# Patient Record
Sex: MALE | Race: WHITE | HISPANIC OR LATINO
[De-identification: names, ages, dates, MRNs, and addresses within clinical notes are randomized per-mention and may not be internally consistent; named-entity substitution may affect disease eponyms.]

---

## 2020-02-21 ENCOUNTER — APPOINTMENT (OUTPATIENT)
Dept: MRI IMAGING | Facility: CLINIC | Age: 40
End: 2020-02-21
Payer: COMMERCIAL

## 2020-02-21 PROCEDURE — 73218 MRI UPPER EXTREMITY W/O DYE: CPT | Mod: LT

## 2021-08-01 ENCOUNTER — EMERGENCY (EMERGENCY)
Facility: HOSPITAL | Age: 41
LOS: 1 days | End: 2021-08-01
Admitting: EMERGENCY MEDICINE
Payer: COMMERCIAL

## 2021-08-01 ENCOUNTER — INPATIENT (INPATIENT)
Facility: HOSPITAL | Age: 41
LOS: 1 days | Discharge: HOME | DRG: 446 | End: 2021-08-03
Attending: HOSPITALIST | Admitting: HOSPITALIST
Payer: COMMERCIAL

## 2021-08-01 ENCOUNTER — TRANSCRIPTION ENCOUNTER (OUTPATIENT)
Age: 41
End: 2021-08-01

## 2021-08-01 VITALS
HEART RATE: 68 BPM | TEMPERATURE: 99 F | OXYGEN SATURATION: 99 % | WEIGHT: 160.06 LBS | DIASTOLIC BLOOD PRESSURE: 82 MMHG | HEIGHT: 65 IN | RESPIRATION RATE: 18 BRPM | SYSTOLIC BLOOD PRESSURE: 126 MMHG

## 2021-08-01 DIAGNOSIS — Z90.49 ACQUIRED ABSENCE OF OTHER SPECIFIED PARTS OF DIGESTIVE TRACT: Chronic | ICD-10-CM

## 2021-08-01 DIAGNOSIS — K80.50 CALCULUS OF BILE DUCT WITHOUT CHOLANGITIS OR CHOLECYSTITIS WITHOUT OBSTRUCTION: ICD-10-CM

## 2021-08-01 LAB
ALBUMIN SERPL ELPH-MCNC: 4.5 G/DL — SIGNIFICANT CHANGE UP (ref 3.3–5.2)
ALP SERPL-CCNC: 143 U/L — HIGH (ref 40–120)
ALT FLD-CCNC: 471 U/L — HIGH
ANION GAP SERPL CALC-SCNC: 12 MMOL/L — SIGNIFICANT CHANGE UP (ref 5–17)
APTT BLD: 35.2 SEC — SIGNIFICANT CHANGE UP (ref 27.5–35.5)
AST SERPL-CCNC: 531 U/L — HIGH
BASOPHILS # BLD AUTO: 0.06 K/UL — SIGNIFICANT CHANGE UP (ref 0–0.2)
BASOPHILS NFR BLD AUTO: 0.7 % — SIGNIFICANT CHANGE UP (ref 0–2)
BILIRUB DIRECT SERPL-MCNC: 1.4 MG/DL — HIGH (ref 0–0.3)
BILIRUB INDIRECT FLD-MCNC: 0.8 MG/DL — SIGNIFICANT CHANGE UP (ref 0.2–1)
BILIRUB SERPL-MCNC: 2.2 MG/DL — HIGH (ref 0.4–2)
BUN SERPL-MCNC: 6.3 MG/DL — LOW (ref 8–20)
CALCIUM SERPL-MCNC: 9.6 MG/DL — SIGNIFICANT CHANGE UP (ref 8.6–10.2)
CHLORIDE SERPL-SCNC: 101 MMOL/L — SIGNIFICANT CHANGE UP (ref 98–107)
CO2 SERPL-SCNC: 26 MMOL/L — SIGNIFICANT CHANGE UP (ref 22–29)
CREAT SERPL-MCNC: 0.55 MG/DL — SIGNIFICANT CHANGE UP (ref 0.5–1.3)
EOSINOPHIL # BLD AUTO: 0.13 K/UL — SIGNIFICANT CHANGE UP (ref 0–0.5)
EOSINOPHIL NFR BLD AUTO: 1.5 % — SIGNIFICANT CHANGE UP (ref 0–6)
GLUCOSE SERPL-MCNC: 87 MG/DL — SIGNIFICANT CHANGE UP (ref 70–99)
HCT VFR BLD CALC: 47.7 % — SIGNIFICANT CHANGE UP (ref 39–50)
HGB BLD-MCNC: 15.1 G/DL — SIGNIFICANT CHANGE UP (ref 13–17)
IMM GRANULOCYTES NFR BLD AUTO: 0.2 % — SIGNIFICANT CHANGE UP (ref 0–1.5)
INR BLD: 1.13 RATIO — SIGNIFICANT CHANGE UP (ref 0.88–1.16)
LYMPHOCYTES # BLD AUTO: 2.04 K/UL — SIGNIFICANT CHANGE UP (ref 1–3.3)
LYMPHOCYTES # BLD AUTO: 23.8 % — SIGNIFICANT CHANGE UP (ref 13–44)
MCHC RBC-ENTMCNC: 28.2 PG — SIGNIFICANT CHANGE UP (ref 27–34)
MCHC RBC-ENTMCNC: 31.7 GM/DL — LOW (ref 32–36)
MCV RBC AUTO: 89.2 FL — SIGNIFICANT CHANGE UP (ref 80–100)
MONOCYTES # BLD AUTO: 0.98 K/UL — HIGH (ref 0–0.9)
MONOCYTES NFR BLD AUTO: 11.4 % — SIGNIFICANT CHANGE UP (ref 2–14)
NEUTROPHILS # BLD AUTO: 5.35 K/UL — SIGNIFICANT CHANGE UP (ref 1.8–7.4)
NEUTROPHILS NFR BLD AUTO: 62.4 % — SIGNIFICANT CHANGE UP (ref 43–77)
PLATELET # BLD AUTO: 402 K/UL — HIGH (ref 150–400)
POTASSIUM SERPL-MCNC: 3.8 MMOL/L — SIGNIFICANT CHANGE UP (ref 3.5–5.3)
POTASSIUM SERPL-SCNC: 3.8 MMOL/L — SIGNIFICANT CHANGE UP (ref 3.5–5.3)
PROT SERPL-MCNC: 7.9 G/DL — SIGNIFICANT CHANGE UP (ref 6.6–8.7)
PROTHROM AB SERPL-ACNC: 13 SEC — SIGNIFICANT CHANGE UP (ref 10.6–13.6)
RBC # BLD: 5.35 M/UL — SIGNIFICANT CHANGE UP (ref 4.2–5.8)
RBC # FLD: 14.1 % — SIGNIFICANT CHANGE UP (ref 10.3–14.5)
SARS-COV-2 RNA SPEC QL NAA+PROBE: SIGNIFICANT CHANGE UP
SODIUM SERPL-SCNC: 139 MMOL/L — SIGNIFICANT CHANGE UP (ref 135–145)
WBC # BLD: 8.58 K/UL — SIGNIFICANT CHANGE UP (ref 3.8–10.5)
WBC # FLD AUTO: 8.58 K/UL — SIGNIFICANT CHANGE UP (ref 3.8–10.5)

## 2021-08-01 PROCEDURE — 71046 X-RAY EXAM CHEST 2 VIEWS: CPT | Mod: 26

## 2021-08-01 PROCEDURE — 99285 EMERGENCY DEPT VISIT HI MDM: CPT

## 2021-08-01 PROCEDURE — 99223 1ST HOSP IP/OBS HIGH 75: CPT

## 2021-08-01 PROCEDURE — 93010 ELECTROCARDIOGRAM REPORT: CPT

## 2021-08-01 PROCEDURE — 74177 CT ABD & PELVIS W/CONTRAST: CPT | Mod: 26,59

## 2021-08-01 NOTE — ED PROVIDER NOTE - OBJECTIVE STATEMENT
Pt is a 41 yo M [place] [facility] [place]. Pt states that 3 wks ago he had an episode of RUQ pain that resolved. Pt states that it happened again earlier this week and once again resolved. Pt states that 3 d ago it came back and has been constant since then. Pt states that the pain is moderate and radiates to the back and feels like his pain from his previous gallstones for which he had his gb taken out. pt states that he went to [facility] and had labs and CT and was told there is a stone in there and was sent to the ER.  no fever/chills. no cp. no sob. no other complaints.

## 2021-08-01 NOTE — H&P ADULT - HISTORY OF PRESENT ILLNESS
39 y/o male with no significant PMH was sent to the ED [place] [facility] [place]. Patient complained of RUQ pain that has been going on for 3 weeks; initially started as sharp pain similar to what he had many years ago before getting cholecystectomy (2012). Pain resolved without intervention but came back later; in the last 3 days, pain has been constant, dull, non-radiating, associated with nausea and one episode on non-bloody emesis and bloating. He has no fever, chills, shortness of breath, chest pain, palpitation, change in bowel/urinary habit, sick contact, recent travel. While in [facility] [place] done: findings most c/w choledocholithiasis. Uncinate pancreatic calcifications are considered an alternate much less likely possibility. CMP with elevated AST/ALT.

## 2021-08-01 NOTE — H&P ADULT - ASSESSMENT
39 y/o male with no significant PMH was sent to the ED from [facility] [place] for possible ERCP. Patient complained of RUQ pain that has been going on for 3 weeks; initially started as sharp pain similar to what he had many years ago before getting cholecystectomy (2012). Pain resolved without intervention but came back later; in the last 3 days, pain has been constant, dull, non-radiating, associated with nausea and one episode on non-bloody emesis and bloating. He has no fever, chills, shortness of breath, chest pain, palpitation, change in bowel/urinary habit, sick contact, recent travel. While in [facility] [place], CT done: findings most c/w choledocholithiasis. Uncinate pancreatic calcifications are considered an alternate much less likely possibility. CMP with elevated AST/ALT.     Choledocholithiasis   Admit to medical floor   CT abdomen as noted above   Pain control   GI consulted     Elevated LFT   AST and ALT markedly elevated compare to prior from [facility] [place]   Total wong: 2.2 (direct 1.4)  Trend LFT     Supportive   DVT prophylaxis: ambulate as tolerated   Diet: NPO after midnight     Plan of care discussed with patient

## 2021-08-01 NOTE — ED PROVIDER NOTE - CLINICAL SUMMARY MEDICAL DECISION MAKING FREE TEXT BOX
labs and imaging from [facility] reviewed. case d/w DR. Knight for admission. will admit for ercp.

## 2021-08-01 NOTE — ED PROVIDER NOTE - PHYSICAL EXAMINATION
Constitutional - well-developed; well nourished. Head - NCAT. Airway patent. Eyes - PERRL. CV - RRR. no murmur. no edema. Pulm - CTAB. Abd - soft, ruq ttp. no rebound. no guarding. Neuro - A&Ox3. strength 5/5 x4. sensation intact x4. normal gait. Skin - No rash. MSK - normal ROM.

## 2021-08-01 NOTE — H&P ADULT - NSICDXFAMILYHX_GEN_ALL_CORE_FT
FAMILY HISTORY:  Mother  Still living? Yes, Estimated age: Age Unknown  FH: diabetes mellitus, Age at diagnosis: Age Unknown

## 2021-08-01 NOTE — H&P ADULT - NSHPPHYSICALEXAM_GEN_ALL_CORE
Vital Signs Last 24 Hrs  T(C): 37.1 (01 Aug 2021 19:50), Max: 37.1 (01 Aug 2021 19:50)  T(F): 98.7 (01 Aug 2021 19:50), Max: 98.7 (01 Aug 2021 19:50)  HR: 68 (01 Aug 2021 19:50) (68 - 68)  BP: 126/82 (01 Aug 2021 19:50) (126/82 - 126/82)  BP(mean): --  RR: 18 (01 Aug 2021 19:50) (18 - 18)  SpO2: 99% (01 Aug 2021 19:50) (99% - 99%)

## 2021-08-02 ENCOUNTER — RESULT REVIEW (OUTPATIENT)
Age: 41
End: 2021-08-02

## 2021-08-02 ENCOUNTER — TRANSCRIPTION ENCOUNTER (OUTPATIENT)
Age: 41
End: 2021-08-02

## 2021-08-02 LAB
ALBUMIN SERPL ELPH-MCNC: 4.2 G/DL — SIGNIFICANT CHANGE UP (ref 3.3–5.2)
ALP SERPL-CCNC: 151 U/L — HIGH (ref 40–120)
ALT FLD-CCNC: 578 U/L — HIGH
ANION GAP SERPL CALC-SCNC: 11 MMOL/L — SIGNIFICANT CHANGE UP (ref 5–17)
AST SERPL-CCNC: 494 U/L — HIGH
BILIRUB SERPL-MCNC: 2.7 MG/DL — HIGH (ref 0.4–2)
BUN SERPL-MCNC: 9.4 MG/DL — SIGNIFICANT CHANGE UP (ref 8–20)
CALCIUM SERPL-MCNC: 9.4 MG/DL — SIGNIFICANT CHANGE UP (ref 8.6–10.2)
CHLORIDE SERPL-SCNC: 100 MMOL/L — SIGNIFICANT CHANGE UP (ref 98–107)
CO2 SERPL-SCNC: 26 MMOL/L — SIGNIFICANT CHANGE UP (ref 22–29)
CREAT SERPL-MCNC: 0.8 MG/DL — SIGNIFICANT CHANGE UP (ref 0.5–1.3)
GLUCOSE SERPL-MCNC: 106 MG/DL — HIGH (ref 70–99)
POTASSIUM SERPL-MCNC: 4 MMOL/L — SIGNIFICANT CHANGE UP (ref 3.5–5.3)
POTASSIUM SERPL-SCNC: 4 MMOL/L — SIGNIFICANT CHANGE UP (ref 3.5–5.3)
PROT SERPL-MCNC: 7.1 G/DL — SIGNIFICANT CHANGE UP (ref 6.6–8.7)
SODIUM SERPL-SCNC: 137 MMOL/L — SIGNIFICANT CHANGE UP (ref 135–145)

## 2021-08-02 PROCEDURE — 43259 EGD US EXAM DUODENUM/JEJUNUM: CPT | Mod: 59

## 2021-08-02 PROCEDURE — 99222 1ST HOSP IP/OBS MODERATE 55: CPT | Mod: 57

## 2021-08-02 PROCEDURE — 99233 SBSQ HOSP IP/OBS HIGH 50: CPT

## 2021-08-02 PROCEDURE — 88342 IMHCHEM/IMCYTCHM 1ST ANTB: CPT | Mod: 26

## 2021-08-02 PROCEDURE — 88305 TISSUE EXAM BY PATHOLOGIST: CPT | Mod: 26

## 2021-08-02 PROCEDURE — 43264 ERCP REMOVE DUCT CALCULI: CPT

## 2021-08-02 RX ORDER — CIPROFLOXACIN LACTATE 400MG/40ML
400 VIAL (ML) INTRAVENOUS EVERY 12 HOURS
Refills: 0 | Status: COMPLETED | OUTPATIENT
Start: 2021-08-02 | End: 2021-08-02

## 2021-08-02 RX ORDER — SODIUM CHLORIDE 9 MG/ML
1000 INJECTION, SOLUTION INTRAVENOUS
Refills: 0 | Status: DISCONTINUED | OUTPATIENT
Start: 2021-08-02 | End: 2021-08-03

## 2021-08-02 RX ORDER — INDOMETHACIN 50 MG
100 CAPSULE ORAL ONCE
Refills: 0 | Status: DISCONTINUED | OUTPATIENT
Start: 2021-08-02 | End: 2021-08-03

## 2021-08-02 RX ADMIN — Medication 200 MILLIGRAM(S): at 10:47

## 2021-08-02 NOTE — DISCHARGE NOTE PROVIDER - HOSPITAL COURSE
39 y/o male with no significant PMH was sent to the ED from [facility] [place] for possible ERCP. While in [facility] [place], [place] done: findings most c/w choledocholithiasis. Uncinate pancreatic calcifications are considered an alternate much less likely possibility. CMP with elevated AST/ALT. GI consult appreciated. s/p ERCP 8/2/21: CBD stones s/p successful sphincterotomy and stone removal   Plan to advance diet tonight, trend liver enzymes in AM.     Physical Exam:       Time spent on discharge: 39 y/o male with no significant PMH was sent to the ED from [facility] [place] for possible ERCP. While in [facility], CT done: findings most c/w choledocholithiasis. Uncinate pancreatic calcifications are considered an alternate much less likely possibility. CMP with elevated AST/ALT. GI consult appreciated. s/p ERCP 8/2/21: CBD stones s/p successful sphincterotomy and stone removal   Plan to advance diet tonight, trend liver enzymes in AM.     Vital Signs Last 24 Hrs  T(C): 36.4 (03 Aug 2021 04:13), Max: 36.8 (02 Aug 2021 17:33)  T(F): 97.5 (03 Aug 2021 04:13), Max: 98.2 (02 Aug 2021 17:33)  HR: 72 (03 Aug 2021 04:13) (70 - 87)  BP: 114/64 (03 Aug 2021 04:13) (106/67 - 131/88)  BP(mean): --  RR: 18 (03 Aug 2021 04:13) (18 - 19)  SpO2: 98% (03 Aug 2021 04:13) (95% - 98%)    Physical exam:  CONSTITUTIONAL: NAD, well-developed, lying in bed   ENMT: Moist oral mucosa  RESPIRATORY: Normal respiratory effort; lungs are clear to auscultation bilaterally  CARDIOVASCULAR: Regular rate and rhythm, normal S1 and S2, No lower extremity edema  ABDOMEN: Nontender to palpation, normoactive bowel sounds  MUSCULOSKELETAL: No clubbing or cyanosis of digits  PSYCH: A+O to person, place, and time; affect appropriate  NEUROLOGY: No gross sensory or motor deficits     Time spent on discharge: 35 mins 41 y/o male with no significant PMH was sent to the ED from [facility] [place] for possible ERCP. While in [facility], [place] done: findings most c/w choledocholithiasis. Uncinate pancreatic calcifications are considered an alternate much less likely possibility. CMP with elevated AST/ALT. GI consult appreciated. s/p ERCP 8/2/21: CBD stones s/p successful sphincterotomy and stone removal     patient monitored opvernight and tolerated diet and is cleared for dc home    Vital Signs Last 24 Hrs  T(C): 36.4 (03 Aug 2021 04:13), Max: 36.8 (02 Aug 2021 17:33)  T(F): 97.5 (03 Aug 2021 04:13), Max: 98.2 (02 Aug 2021 17:33)  HR: 72 (03 Aug 2021 04:13) (70 - 87)  BP: 114/64 (03 Aug 2021 04:13) (106/67 - 131/88)  BP(mean): --  RR: 18 (03 Aug 2021 04:13) (18 - 19)  SpO2: 98% (03 Aug 2021 04:13) (95% - 98%)    Physical exam:  CONSTITUTIONAL: NAD, well-developed, lying in bed   ENMT: Moist oral mucosa  RESPIRATORY: Normal respiratory effort; lungs are clear to auscultation bilaterally  CARDIOVASCULAR: Regular rate and rhythm, normal S1 and S2, No lower extremity edema  ABDOMEN: Nontender to palpation, normoactive bowel sounds  MUSCULOSKELETAL: No clubbing or cyanosis of digits  PSYCH: A+O to person, place, and time; affect appropriate  NEUROLOGY: No gross sensory or motor deficits     Time spent on discharge: 35 mins

## 2021-08-02 NOTE — DISCHARGE NOTE PROVIDER - NSDCCPCAREPLAN_GEN_ALL_CORE_FT
PRINCIPAL DISCHARGE DIAGNOSIS  Diagnosis: Choledocholithiasis  Assessment and Plan of Treatment: s/p ERCP: CBD stones s/p successful sphincterotomy and stone removal   Plan to advance diet       PRINCIPAL DISCHARGE DIAGNOSIS  Diagnosis: Choledocholithiasis  Assessment and Plan of Treatment: s/p ERCP: CBD stones s/p successful sphincterotomy and stone removal. Tolerating diet. Discharge and outpatinet follow up with GI, contact info provided.       PRINCIPAL DISCHARGE DIAGNOSIS  Diagnosis: Choledocholithiasis  Assessment and Plan of Treatment: s/p ERCP: CBD stones s/p successful sphincterotomy and stone removal. Tolerating diet. Discharge and outpatient follow up wt PMD in 1 week for repeat CMP. Outpatinet follow up with GI as needed, contact info provided.       PRINCIPAL DISCHARGE DIAGNOSIS  Diagnosis: Choledocholithiasis  Assessment and Plan of Treatment: s/p ERCP: CBD stones s/p successful sphincterotomy and stone removal. Tolerating diet. Discharge and outpatient follow up wtih PMD in 1 week for repeat CMP. Outpatinet follow up with GI in 2 weeks from discharge, contact info provided.

## 2021-08-02 NOTE — CONSULT NOTE ADULT - ASSESSMENT
The patient with history of cholecystectomy and now with abdominal pain and elevated LFTs with possible choledocholithiasis on CT abdomen.  We will schedule the patient for EGD/EUS and ERCP today.  Patient is n.p.o.  Discussed the procedure at length with the patient and he is agreeable.  Risks, benefits and alternatives were discussed.

## 2021-08-02 NOTE — PROGRESS NOTE ADULT - ASSESSMENT
41 y/o male with no significant PMH was sent to the ED from [facility] for possible ERCP. While in [facility], CT done: findings most c/w choledocholithiasis. Uncinate pancreatic calcifications are considered an alternate much less likely possibility. CMP with elevated AST/ALT.     Choledocholithiasis   CT abdomen as noted above   Pain control   GI consult appreciated  s/p ERCP today: CBD stones s/p successful sphincterotomy and stone removal   Plan to advance diet tonight, trend liver enzymes in AM     Elevated LFT   AST and ALT markedly elevated   Total wong: 2.2 (direct 1.4)  Trend LFT     Supportive   DVT prophylaxis: ambulate as tolerated   Diet: Advance diet    Dispo: Possible DC in AM

## 2021-08-02 NOTE — PROGRESS NOTE ADULT - SUBJECTIVE AND OBJECTIVE BOX
[facility] Division of Hospital Medicine    Chief Complaint:  Choledocholithiasis    SUBJECTIVE / OVERNIGHT EVENTS:  Overnight pt said he had some abdominal pain  This morning feels better, no abdominal pain currently     Patient denies chest pain, SOB, abd pain, N/V, fever, chills, dysuria or any other complaints. All remainder ROS negative.     MEDICATIONS  (STANDING):  indomethacin Suppository 100 milliGRAM(s) Rectal once  lactated ringers. 1000 milliLiter(s) (75 mL/Hr) IV Continuous <Continuous>    MEDICATIONS  (PRN):        I&O's Summary    02 Aug 2021 07:01  -  02 Aug 2021 16:25  --------------------------------------------------------  IN: 725 mL / OUT: 0 mL / NET: 725 mL        PHYSICAL EXAM:  Vital Signs Last 24 Hrs  T(C): 36.5 (02 Aug 2021 01:18), Max: 37.1 (01 Aug 2021 19:50)  T(F): 97.7 (02 Aug 2021 01:18), Max: 98.7 (01 Aug 2021 19:50)  HR: 73 (02 Aug 2021 14:48) (62 - 73)  BP: 126/80 (02 Aug 2021 14:48) (101/69 - 126/82)  BP(mean): --  RR: 19 (02 Aug 2021 14:48) (18 - 19)  SpO2: 98% (02 Aug 2021 14:48) (97% - 99%)        CONSTITUTIONAL: NAD, well-developed, lying in bed   ENMT: Moist oral mucosa  RESPIRATORY: Normal respiratory effort; lungs are clear to auscultation bilaterally  CARDIOVASCULAR: Regular rate and rhythm, normal S1 and S2, No lower extremity edema  ABDOMEN: Nontender to palpation, normoactive bowel sounds  MUSCULOSKELETAL: No clubbing or cyanosis of digits  PSYCH: A+O to person, place, and time; affect appropriate  NEUROLOGY: No gross sensory or motor deficits   SKIN: No rashes    LABS:                        15.1   8.58  )-----------( 402      ( 01 Aug 2021 21:01 )             47.7     08-02    137  |  100  |  9.4  ----------------------------<  106<H>  4.0   |  26.0  |  0.80    Ca    9.4      02 Aug 2021 07:51    TPro  7.1  /  Alb  4.2  /  TBili  2.7<H>  /  DBili  x   /  AST  494<H>  /  ALT  578<H>  /  AlkPhos  151<H>  08-02    PT/INR - ( 01 Aug 2021 21:01 )   PT: 13.0 sec;   INR: 1.13 ratio         PTT - ( 01 Aug 2021 21:01 )  PTT:35.2 sec

## 2021-08-02 NOTE — CONSULT NOTE ADULT - SUBJECTIVE AND OBJECTIVE BOX
HPI:  40-year-old man with a history of cholecystectomy in 2012 who has presented to [facility] for recurrent epigastric discomfort which is going on for the last 3 weeks..  He had one episode of vomiting.  It was not related to food intake.  No fever or chills, shortness of breath, chest pain or change in bowel habits.  He presented to [facility] and underwent CT abdomen which revealed question choledocholithiasis with uncinate pancreatic calcifications.  LFTs are elevated.  Currently his abdominal pain is better.  He is n.p.o.  No other complaints right now.      PAST MEDICAL & SURGICAL HISTORY:  S/P cholecystectomy        ROS:  No Heartburn, regurgitation, dysphagia, odynophagia.  No dyspepsia  +abdominal pain.    No Nausea, +vomiting.  No Bleeding.  No hematemesis.   No diarrhea.    No hematochesia.  No weight loss, anorexia.  No edema.      MEDICATIONS  (STANDING):  lactated ringers. 1000 milliLiter(s) (75 mL/Hr) IV Continuous <Continuous>    MEDICATIONS  (PRN):      Allergies    No Known Allergies    Intolerances        SOCIAL HISTORY:No smoking. Occasional alcohol. No drug     ENDOSCOPIC/GI HISTORY: EGD and colonoscopy in the past for diarrhea. No ERCP    FAMILY HISTORY:  FH: diabetes mellitus (Mother)        Vital Signs Last 24 Hrs  T(C): 36.5 (02 Aug 2021 01:18), Max: 37.1 (01 Aug 2021 19:50)  T(F): 97.7 (02 Aug 2021 01:18), Max: 98.7 (01 Aug 2021 19:50)  HR: 62 (02 Aug 2021 01:18) (62 - 68)  BP: 109/73 (02 Aug 2021 01:18) (101/69 - 126/82)  BP(mean): --  RR: 18 (02 Aug 2021 01:18) (18 - 18)  SpO2: 97% (02 Aug 2021 01:18) (97% - 99%)    PHYSICAL EXAM:    GENERAL: NAD, well-groomed, well-developed  HEAD:  Atraumatic, Normocephalic  EYES: EOMI, PERRLA, conjunctiva and sclera clear  ENMT: No tonsillar erythema, exudates, or enlargement; Moist mucous membranes, Good dentition, No lesions  NECK: Supple, No JVD, Normal thyroid  CHEST/LUNG: Clear to percussion bilaterally; No rales, rhonchi, wheezing, or rubs  HEART: Regular rate and rhythm; No murmurs, rubs, or gallops  ABDOMEN: Soft, Nontender, Nondistended; Bowel sounds present  EXTREMITIES:  2+ Peripheral Pulses, No clubbing, cyanosis, or edema  LYMPH: No lymphadenopathy noted  SKIN: No rashes or lesions      LABS:                        15.1   8.58  )-----------( 402      ( 01 Aug 2021 21:01 )             47.7     08-01    139  |  101  |  6.3<L>  ----------------------------<  87  3.8   |  26.0  |  0.55    Ca    9.6      01 Aug 2021 21:01    TPro  7.9  /  Alb  4.5  /  TBili  2.2<H>  /  DBili  1.4<H>  /  AST  531<H>  /  ALT  471<H>  /  AlkPhos  143<H>  08-01    PT/INR - ( 01 Aug 2021 21:01 )   PT: 13.0 sec;   INR: 1.13 ratio         PTT - ( 01 Aug 2021 21:01 )  PTT:35.2 sec       LIVER FUNCTIONS - ( 01 Aug 2021 21:01 )  Alb: 4.5 g/dL / Pro: 7.9 g/dL / ALK PHOS: 143 U/L / ALT: 471 U/L / AST: 531 U/L / GGT: x               RADIOLOGY & ADDITIONAL STUDIES:  EXAM: CT ABDOMEN AND PELVIS OC IC    PROCEDURE DATE: 08/01/2021          INTERPRETATION: CLINICAL INFORMATION: Abdominal RUQ pain    COMPARISON: None.    CONTRAST/COMPLICATIONS:  IV Contrast: Omnipaque 350 90 cc administered 10 cc discarded  Oral Contrast: Gastroview  Complications: None reported at time of study completion    PROCEDURE:  CT of the Abdomen and Pelvis was performed.  Sagittal and coronal reformats were performed.    FINDINGS:  LOWER CHEST: Within normal limits.    LIVER: Within normal limits.  BILE DUCTS: 2-3 adjacent/stacked uncinate pancreas 1-3mm stones/calcification appear to correspond to a nondilated distal CBD (coronal 602-42). No intrahepatic biliary duct dilatation  GALLBLADDER: Cholecystectomy. Faint calcification/stone in the region of the cystic duct (2/32).  SPLEEN: Within normal limits.  PANCREAS: Within normal limits.  ADRENALS: Within normal limits.  KIDNEYS/URETERS: RIGHT extrarenal pelvis. No renal stones/obstructive uropathy.    BLADDER: Within normal limits.  REPRODUCTIVE ORGANS: Prominent prostate.    BOWEL: No bowel obstruction. Appendix is normal.  PERITONEUM: No ascites.  VESSELS: Within normal limits.  RETROPERITONEUM/LYMPH NODES: No lymphadenopathy.  ABDOMINAL WALL: Within normal limits.  BONES: Within normal limits.    IMPRESSION:  1. Findings most c/w choledocholithiasis. MRCP or ERCP are next diagnostic studies of choice. Uncinate pancreatic calcifications are considered an alternate much less likely possibility.    Findings were discussed with Dr. KELVIN BEACH [phone] 8/1/2021 2:29 PM by Dr. Lopez with read back confirmation.    --- End of Report ---

## 2021-08-02 NOTE — DISCHARGE NOTE PROVIDER - CARE PROVIDER_API CALL
López Henson)  Gastroenterology; Internal Medicine  [address] [place] [address]  Phone: [phone]  Fax: [phone]  Follow Up Time:    López Henson)  Gastroenterology; Internal Medicine  [address] [place] [address]  Phone: [phone]  Fax: [phone]  Follow Up Time:     PMD,   Phone: (   )    -  Fax: (   )    -  Follow Up Time:    López Henson)  Gastroenterology; Internal Medicine  [address] [place] [address]  Phone: [phone]  Fax: [phone]  Follow Up Time: 2 weeks    PMD,   Phone: (   )    -  Fax: (   )    -  Follow Up Time: 1 week

## 2021-08-02 NOTE — DISCHARGE NOTE PROVIDER - CARE PROVIDERS DIRECT ADDRESSES
,[email or web address],DirectAddress_Unknown

## 2021-08-02 NOTE — DISCHARGE NOTE PROVIDER - PROVIDER TOKENS
PROVIDER:[TOKEN:[[id number]]] PROVIDER:[TOKEN:[[id number]]],FREE:[LAST:[PMD],PHONE:[(   )    -],FAX:[(   )    -]] PROVIDER:[TOKEN:[[id number]],FOLLOWUP:[2 weeks]],FREE:[LAST:[PMD],PHONE:[(   )    -],FAX:[(   )    -],FOLLOWUP:[1 week]]

## 2021-08-02 NOTE — ED ADULT NURSE NOTE - OBJECTIVE STATEMENT
patient c/o abdominal pain and nausea RUQ pain, went to [facility] and transferred here for ERCP in AM. Patient well appearing and in no distress. Arrived with 18g IV line to left AC in place. Hx of cholecystectomy in the past.

## 2021-08-03 ENCOUNTER — TRANSCRIPTION ENCOUNTER (OUTPATIENT)
Age: 41
End: 2021-08-03

## 2021-08-03 VITALS
DIASTOLIC BLOOD PRESSURE: 72 MMHG | OXYGEN SATURATION: 96 % | HEART RATE: 80 BPM | TEMPERATURE: 98 F | RESPIRATION RATE: 19 BRPM | SYSTOLIC BLOOD PRESSURE: 119 MMHG

## 2021-08-03 LAB
ALBUMIN SERPL ELPH-MCNC: 4.2 G/DL — SIGNIFICANT CHANGE UP (ref 3.3–5.2)
ALP SERPL-CCNC: 147 U/L — HIGH (ref 40–120)
ALT FLD-CCNC: 463 U/L — HIGH
ANION GAP SERPL CALC-SCNC: 10 MMOL/L — SIGNIFICANT CHANGE UP (ref 5–17)
AST SERPL-CCNC: 177 U/L — HIGH
BASOPHILS # BLD AUTO: 0.03 K/UL — SIGNIFICANT CHANGE UP (ref 0–0.2)
BASOPHILS NFR BLD AUTO: 0.2 % — SIGNIFICANT CHANGE UP (ref 0–2)
BILIRUB SERPL-MCNC: 1.1 MG/DL — SIGNIFICANT CHANGE UP (ref 0.4–2)
BUN SERPL-MCNC: 5.5 MG/DL — LOW (ref 8–20)
CALCIUM SERPL-MCNC: 9.6 MG/DL — SIGNIFICANT CHANGE UP (ref 8.6–10.2)
CHLORIDE SERPL-SCNC: 103 MMOL/L — SIGNIFICANT CHANGE UP (ref 98–107)
CO2 SERPL-SCNC: 27 MMOL/L — SIGNIFICANT CHANGE UP (ref 22–29)
COVID-19 SPIKE DOMAIN AB INTERP: POSITIVE
COVID-19 SPIKE DOMAIN ANTIBODY RESULT: >250 U/ML — HIGH
CREAT SERPL-MCNC: 0.67 MG/DL — SIGNIFICANT CHANGE UP (ref 0.5–1.3)
EOSINOPHIL # BLD AUTO: 0.02 K/UL — SIGNIFICANT CHANGE UP (ref 0–0.5)
EOSINOPHIL NFR BLD AUTO: 0.2 % — SIGNIFICANT CHANGE UP (ref 0–6)
GLUCOSE SERPL-MCNC: 92 MG/DL — SIGNIFICANT CHANGE UP (ref 70–99)
HCT VFR BLD CALC: 42.8 % — SIGNIFICANT CHANGE UP (ref 39–50)
HGB BLD-MCNC: 14.3 G/DL — SIGNIFICANT CHANGE UP (ref 13–17)
IMM GRANULOCYTES NFR BLD AUTO: 0.4 % — SIGNIFICANT CHANGE UP (ref 0–1.5)
LYMPHOCYTES # BLD AUTO: 1.99 K/UL — SIGNIFICANT CHANGE UP (ref 1–3.3)
LYMPHOCYTES # BLD AUTO: 15.2 % — SIGNIFICANT CHANGE UP (ref 13–44)
MCHC RBC-ENTMCNC: 29.1 PG — SIGNIFICANT CHANGE UP (ref 27–34)
MCHC RBC-ENTMCNC: 33.4 GM/DL — SIGNIFICANT CHANGE UP (ref 32–36)
MCV RBC AUTO: 87 FL — SIGNIFICANT CHANGE UP (ref 80–100)
MONOCYTES # BLD AUTO: 0.9 K/UL — SIGNIFICANT CHANGE UP (ref 0–0.9)
MONOCYTES NFR BLD AUTO: 6.9 % — SIGNIFICANT CHANGE UP (ref 2–14)
NEUTROPHILS # BLD AUTO: 10.14 K/UL — HIGH (ref 1.8–7.4)
NEUTROPHILS NFR BLD AUTO: 77.1 % — HIGH (ref 43–77)
PLATELET # BLD AUTO: 402 K/UL — HIGH (ref 150–400)
POTASSIUM SERPL-MCNC: 4.6 MMOL/L — SIGNIFICANT CHANGE UP (ref 3.5–5.3)
POTASSIUM SERPL-SCNC: 4.6 MMOL/L — SIGNIFICANT CHANGE UP (ref 3.5–5.3)
PROT SERPL-MCNC: 7 G/DL — SIGNIFICANT CHANGE UP (ref 6.6–8.7)
RBC # BLD: 4.92 M/UL — SIGNIFICANT CHANGE UP (ref 4.2–5.8)
RBC # FLD: 14.3 % — SIGNIFICANT CHANGE UP (ref 10.3–14.5)
SARS-COV-2 IGG+IGM SERPL QL IA: >250 U/ML — HIGH
SARS-COV-2 IGG+IGM SERPL QL IA: POSITIVE
SODIUM SERPL-SCNC: 140 MMOL/L — SIGNIFICANT CHANGE UP (ref 135–145)
WBC # BLD: 13.13 K/UL — HIGH (ref 3.8–10.5)
WBC # FLD AUTO: 13.13 K/UL — HIGH (ref 3.8–10.5)

## 2021-08-03 PROCEDURE — 85730 THROMBOPLASTIN TIME PARTIAL: CPT

## 2021-08-03 PROCEDURE — 87635 SARS-COV-2 COVID-19 AMP PRB: CPT

## 2021-08-03 PROCEDURE — 88342 IMHCHEM/IMCYTCHM 1ST ANTB: CPT

## 2021-08-03 PROCEDURE — 85610 PROTHROMBIN TIME: CPT

## 2021-08-03 PROCEDURE — 86769 SARS-COV-2 COVID-19 ANTIBODY: CPT

## 2021-08-03 PROCEDURE — 36415 COLL VENOUS BLD VENIPUNCTURE: CPT

## 2021-08-03 PROCEDURE — 85025 COMPLETE CBC W/AUTO DIFF WBC: CPT

## 2021-08-03 PROCEDURE — 80053 COMPREHEN METABOLIC PANEL: CPT

## 2021-08-03 PROCEDURE — 86901 BLOOD TYPING SEROLOGIC RH(D): CPT

## 2021-08-03 PROCEDURE — 74330 X-RAY BILE/PANC ENDOSCOPY: CPT

## 2021-08-03 PROCEDURE — C1769: CPT

## 2021-08-03 PROCEDURE — 88305 TISSUE EXAM BY PATHOLOGIST: CPT

## 2021-08-03 PROCEDURE — 86900 BLOOD TYPING SEROLOGIC ABO: CPT

## 2021-08-03 PROCEDURE — 80048 BASIC METABOLIC PNL TOTAL CA: CPT

## 2021-08-03 PROCEDURE — 86850 RBC ANTIBODY SCREEN: CPT

## 2021-08-03 PROCEDURE — 80076 HEPATIC FUNCTION PANEL: CPT

## 2021-08-03 RX ADMIN — SODIUM CHLORIDE 75 MILLILITER(S): 9 INJECTION, SOLUTION INTRAVENOUS at 05:14

## 2021-08-03 NOTE — DISCHARGE NOTE NURSING/CASE MANAGEMENT/SOCIAL WORK - PATIENT PORTAL LINK FT
You can access the FollowMyHealth Patient Portal offered by [facility] by registering at the following website: [email or web address]. By joining [facility]’s FollowMyHealth portal, you will also be able to view your health information using other applications (apps) compatible with our system.

## 2021-08-04 LAB — SURGICAL PATHOLOGY STUDY: SIGNIFICANT CHANGE UP

## 2021-08-05 PROBLEM — Z00.00 ENCOUNTER FOR PREVENTIVE HEALTH EXAMINATION: Status: ACTIVE | Noted: 2021-08-05

## 2021-10-24 ENCOUNTER — APPOINTMENT (OUTPATIENT)
Dept: DISASTER EMERGENCY | Facility: CLINIC | Age: 41
End: 2021-10-24

## 2021-10-24 DIAGNOSIS — Z01.818 ENCOUNTER FOR OTHER PREPROCEDURAL EXAMINATION: ICD-10-CM

## 2021-10-25 LAB — SARS-COV-2 N GENE NPH QL NAA+PROBE: NOT DETECTED

## 2021-10-27 ENCOUNTER — OUTPATIENT (OUTPATIENT)
Dept: OUTPATIENT SERVICES | Facility: HOSPITAL | Age: 41
LOS: 1 days | End: 2021-10-27

## 2021-10-27 DIAGNOSIS — Z90.49 ACQUIRED ABSENCE OF OTHER SPECIFIED PARTS OF DIGESTIVE TRACT: Chronic | ICD-10-CM
